# Patient Record
Sex: FEMALE
[De-identification: names, ages, dates, MRNs, and addresses within clinical notes are randomized per-mention and may not be internally consistent; named-entity substitution may affect disease eponyms.]

---

## 2021-06-16 PROBLEM — Z00.00 ENCOUNTER FOR PREVENTIVE HEALTH EXAMINATION: Status: ACTIVE | Noted: 2021-06-16

## 2021-06-22 ENCOUNTER — APPOINTMENT (OUTPATIENT)
Dept: RADIATION ONCOLOGY | Facility: CLINIC | Age: 66
End: 2021-06-22
Payer: COMMERCIAL

## 2021-06-22 DIAGNOSIS — Z78.9 OTHER SPECIFIED HEALTH STATUS: ICD-10-CM

## 2021-06-22 DIAGNOSIS — Z86.39 PERSONAL HISTORY OF OTHER ENDOCRINE, NUTRITIONAL AND METABOLIC DISEASE: ICD-10-CM

## 2021-06-22 PROCEDURE — 99203 OFFICE O/P NEW LOW 30 MIN: CPT | Mod: 25,95

## 2021-06-22 RX ORDER — LEVOTHYROXINE SODIUM 0.07 MG/1
75 TABLET ORAL DAILY
Qty: 30 | Refills: 0 | Status: ACTIVE | COMMUNITY
Start: 2021-06-22

## 2021-06-22 NOTE — OB/GYN HISTORY
[Currently In Menopause] : currently in menopause [Menopause Age: ____] : patient was [unfilled] years old at menopause [___] : Total Pregnancies: [unfilled]

## 2021-07-01 NOTE — LETTER CLOSING
[Consult Closing:] : Thank you for allowing me to participate in the care of this patient.  If you have any questions, please do not hesitate to contact me. [Sincerely yours,] : Sincerely yours, [FreeTextEntry3] : Mirna Pate MD\par

## 2021-07-01 NOTE — HISTORY OF PRESENT ILLNESS
[Home] : at home, [unfilled] , at the time of the visit. [Medical Office: ([facility])___] : at the medical office located in  [Verbal consent obtained from patient] : the patient, [unfilled] [FreeTextEntry1] : Ms. Samuels is a 66 year old woman with newly diagnosed DCIS, who presents for radiotherapy recommendations.\par \par She had mammogram on 11/19/2020, showing indeterminate calcifications in the upper outer left breast, posterior depth.  BI-RADS 0.\par Diagnostic left mammogram on 12/11/20 showed new microcalcifications in the left lateral breast.  These calcifications were seen immediately anterior to a group of coarse stable benign-appearing calcifications.  Biopsy was recommended. \par \par On 1/28/21, she underwent left breast biopsy 3:00, 12 cmfn, with calcifications.  Pathology showing DCIS, intermediate nuclear grade, solid type with central necrosis and associated calcifications. Biopsy left breast 3:00, 12 cm fn, without calcifications.  Pathology shows fibroadipose breast tissue. DCIS was ER/UT positive %.\par \eduar Underwent left lumpectomy on 5/17/2021 at [facility] with Dr. Solis.  Pathology showed DCIS (6.5 mm), intermediate grade.  Distance to closet margin is less than 1 mm; closet margin posterior.   \par \par Interval history - She is feeling well, without pain, recovered well from lumpectomy.  She is overall in good health, does not have med onc provider, as she does not feel she needs one as she declines AI treatment at this time.

## 2021-07-19 ENCOUNTER — OUTPATIENT (OUTPATIENT)
Dept: OUTPATIENT SERVICES | Facility: HOSPITAL | Age: 66
LOS: 1 days | Discharge: HOME | End: 2021-07-19
Payer: COMMERCIAL

## 2021-07-19 ENCOUNTER — RESULT REVIEW (OUTPATIENT)
Age: 66
End: 2021-07-19

## 2021-07-19 PROCEDURE — 77263 THER RADIOLOGY TX PLNG CPLX: CPT

## 2021-07-19 PROCEDURE — 99072 ADDL SUPL MATRL&STAF TM PHE: CPT

## 2021-07-20 LAB — SURGICAL PATHOLOGY STUDY: SIGNIFICANT CHANGE UP

## 2021-07-21 ENCOUNTER — APPOINTMENT (OUTPATIENT)
Dept: RADIATION ONCOLOGY | Facility: CLINIC | Age: 66
End: 2021-07-21
Payer: COMMERCIAL

## 2021-07-21 ENCOUNTER — TRANSCRIPTION ENCOUNTER (OUTPATIENT)
Age: 66
End: 2021-07-21

## 2021-07-21 VITALS
SYSTOLIC BLOOD PRESSURE: 148 MMHG | OXYGEN SATURATION: 98 % | DIASTOLIC BLOOD PRESSURE: 86 MMHG | RESPIRATION RATE: 17 BRPM | HEART RATE: 16 BPM

## 2021-07-21 DIAGNOSIS — D05.10 INTRADUCTAL CARCINOMA IN SITU OF UNSPECIFIED BREAST: ICD-10-CM

## 2021-07-21 PROCEDURE — 77290 THER RAD SIMULAJ FIELD CPLX: CPT | Mod: 26

## 2021-07-21 PROCEDURE — 77333 RADIATION TREATMENT AID(S): CPT | Mod: 26

## 2021-07-21 PROCEDURE — 99072 ADDL SUPL MATRL&STAF TM PHE: CPT

## 2021-07-21 PROCEDURE — 99213 OFFICE O/P EST LOW 20 MIN: CPT | Mod: 25,GC

## 2021-07-22 NOTE — PHYSICAL EXAM
[Sclera] : the sclera and conjunctiva were normal [Outer Ear] : the ears and nose were normal in appearance [] : no respiratory distress [Breast Palpation Mass] : no palpable masses [Breast Abnormal Lactation (Galactorrhea)] : no nipple discharge [No UE Edema] : there is no upper extremity edema [No Focal Deficits] : no focal deficits [Oriented To Time, Place, And Person] : oriented to person, place, and time [Normal] : well developed, well nourished, in no acute distress [Abdomen Soft] : soft [Nondistended] : nondistended [Axillary Lymph Nodes Enlarged Bilaterally] : axillary [Supraclavicular Lymph Nodes Enlarged Bilaterally] : supraclavicular [de-identified] : Left lumpectomy scar is well healed, no erythema

## 2021-07-22 NOTE — HISTORY OF PRESENT ILLNESS
[FreeTextEntry1] : Ms. 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 She was undergoing routine screening mammography, every other year or so. She had mammogram on 11/19/2020, showing indeterminate calcifications in the upper outer left breast, posterior depth. This was new compared to prior exams from 2019, 2017 and 2016. \par \par Diagnostic left mammogram on 12/11/20 showed new microcalcifications in the far lateral left breast. These calcifications were seen immediately anterior to a group of coarse stable benign-appearing calcifications. Biopsy was recommended. \par \par On 1/28/21, she underwent stereotactic left breast biopsy at 3:00, 12 cmfn, with calcifications. Pathology showing DCIS, intermediate nuclear grade, solid type with central necrosis and associated calcifications. Biopsy left breast 3:00, 12 cm fn, without calcifications showed fibroadipose breast tissue. DCIS was ER/MO positive both %.\par \par Underwent left lumpectomy on 5/17/2021 at [facility] with Dr. Solis. Pathology showed DCIS (6.5 mm), cribriform and solid patterns, intermediate nuclear grade, present in 4 out of 27 blocks. Comedo necrosis was present.  Distance to closet margin is less than 1 mm; closest margin was posterior. \par \par  She is feeling well, without pain, recovered well from lumpectomy. She opted against re-excision. She presents for consent for treatment and simulation for RT planning.  She denies breast pain or lumps.  Lumpectomy incision healed well.

## 2021-07-23 PROCEDURE — 77295 3-D RADIOTHERAPY PLAN: CPT | Mod: 26

## 2021-07-23 PROCEDURE — 77334 RADIATION TREATMENT AID(S): CPT | Mod: 26

## 2021-07-23 PROCEDURE — 77300 RADIATION THERAPY DOSE PLAN: CPT | Mod: 26

## 2021-08-02 PROCEDURE — 77280 THER RAD SIMULAJ FIELD SMPL: CPT | Mod: 26

## 2021-08-04 ENCOUNTER — NON-APPOINTMENT (OUTPATIENT)
Age: 66
End: 2021-08-04

## 2021-08-09 PROCEDURE — 77427 RADIATION TX MANAGEMENT X5: CPT

## 2021-08-11 ENCOUNTER — NON-APPOINTMENT (OUTPATIENT)
Age: 66
End: 2021-08-11

## 2021-08-11 NOTE — PHYSICAL EXAM
[General Appearance - Well Developed] : well developed [de-identified] : .Left lumpectomy scar is well healed, mild erythema.

## 2021-08-11 NOTE — DISEASE MANAGEMENT
[Pathological] : TNM Stage: p [0] : 0 [TTNM] : is [NTNM] : 0 [MTNM] : 0 [de-identified] : 0379nGy [de-identified] : 5240cGy [de-identified] : Left breast

## 2021-08-11 NOTE — HISTORY OF PRESENT ILLNESS
[FreeTextEntry1] : Ms. 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 She underwent lumpectomy with close but negative margins. She is now receiving adjuvant radiation therapy. \par \par She comes in for a visit on treatment.  Denies breast pain or fatigue.  She is using Hylatopic Plus cream, recommended by dermatologist.  Some pinkness to the skin, but otherwise well.

## 2021-08-11 NOTE — DISEASE MANAGEMENT
[Pathological] : TNM Stage: p [0] : 0 [TTNM] : is [NTNM] : 0 [MTNM] : 0 [de-identified] : 530cGy [de-identified] : 5240cGy [de-identified] : Left breast

## 2021-08-11 NOTE — HISTORY OF PRESENT ILLNESS
[FreeTextEntry1] : Ms. Samuels is a 66 year old woman with stage 0, [id number], hormone receptor positive DCIS of the left breast. She underwent lumpectomy with close but negative margins. She is now receiving adjuvant radiation therapy. \par \par She is feeling generally well. She denies fatigue and pain. She has not noted any skin reactions. She is using something her dermatologist recommended on the skin. will bring it in next time

## 2021-08-11 NOTE — PHYSICAL EXAM
[General Appearance - Well Developed] : well developed [de-identified] : .Left lumpectomy scar is well healed, no erythema.

## 2021-08-16 PROCEDURE — 77427 RADIATION TX MANAGEMENT X5: CPT

## 2021-08-18 ENCOUNTER — NON-APPOINTMENT (OUTPATIENT)
Age: 66
End: 2021-08-18

## 2021-08-18 PROCEDURE — 77280 THER RAD SIMULAJ FIELD SMPL: CPT | Mod: 26

## 2021-08-18 NOTE — HISTORY OF PRESENT ILLNESS
[FreeTextEntry1] : Ms. Samuels is a 66 year old woman with stage 0, [id number], hormone receptor positive DCIS of the left breast. She underwent lumpectomy with close but negative margins. She is now receiving adjuvant radiation therapy. \par \par She comes in for a visit on treatment.  Denies breast pain or fatigue.  She is using Hylatopic Plus cream, recommended by dermatologist.  Some pinkness to the skin, but otherwise well.

## 2021-08-18 NOTE — PHYSICAL EXAM
[Normal] : well developed, well nourished, in no acute distress [de-identified] : .Left lumpectomy scar is well healed, mild erythema.

## 2021-08-18 NOTE — DISEASE MANAGEMENT
[Pathological] : TNM Stage: p [0] : 0 [TTNM] : is [NTNM] : 0 [MTNM] : 0 [de-identified] : 7109cGy [de-identified] : Left breast [de-identified] : 5240cGy

## 2021-08-23 PROCEDURE — 77427 RADIATION TX MANAGEMENT X5: CPT

## 2021-08-25 ENCOUNTER — NON-APPOINTMENT (OUTPATIENT)
Age: 66
End: 2021-08-25

## 2021-08-30 ENCOUNTER — NON-APPOINTMENT (OUTPATIENT)
Age: 66
End: 2021-08-30

## 2021-08-30 PROCEDURE — 77427 RADIATION TX MANAGEMENT X5: CPT

## 2021-08-31 NOTE — DISEASE MANAGEMENT
[Pathological] : TNM Stage: p [0] : 0 [TTNM] : is [NTNM] : 0 [MTNM] : 0 [de-identified] : 5240cGy [de-identified] : 5240cGy [de-identified] : Left breast

## 2021-08-31 NOTE — REVIEW OF SYSTEMS
[Alopecia: Grade 0] : Alopecia: Grade 0 [Skin Atrophy: Grade 0] : Skin Atrophy: Grade 0 [Skin Hyperpigmentation: Grade 1 - Hyperpigmentation covering <10% BSA; no psychosocial impact] : Skin Hyperpigmentation: Grade 1 - Hyperpigmentation covering <10% BSA; no psychosocial impact [Skin Induration: Grade 0] : Skin Induration: Grade 0 [Dermatitis Radiation: Grade 1 - Faint erythema or dry desquamation] : Dermatitis Radiation: Grade 1 - Faint erythema or dry desquamation [Pruritus: Grade 1 - Mild or localized; topical intervention indicated] : Pruritus: Grade 1 - Mild or localized; topical intervention indicated

## 2021-08-31 NOTE — PHYSICAL EXAM
[Normal] : well developed, well nourished, in no acute distress [de-identified] : .Left lumpectomy scar is well healed, mild erythema.

## 2021-08-31 NOTE — PHYSICAL EXAM
[Normal] : well developed, well nourished, in no acute distress [de-identified] : .Left lumpectomy scar is well healed, mild to moderate erythema and hyperpigmentation, scattered rash

## 2021-08-31 NOTE — HISTORY OF PRESENT ILLNESS
[FreeTextEntry1] : Ms. Samuels is a 66 year old woman with stage 0, [id number], hormone receptor positive DCIS of the left breast. She underwent lumpectomy with close but negative margins. She is now receiving adjuvant radiation therapy. \par \par She is feeling generally well. She is mildly fatigued but remains busy and active. She has occasional mild pain in the breast but does not require analgesics. She has noted increasing redness of the skin. She is using Hylatopic Plus cream on the skin.

## 2021-08-31 NOTE — DISEASE MANAGEMENT
[Pathological] : TNM Stage: p [0] : 0 [TTNM] : is [NTNM] : 0 [MTNM] : 0 [de-identified] : 2340cGy [de-identified] : 5240cGy [de-identified] : Left breast

## 2021-08-31 NOTE — HISTORY OF PRESENT ILLNESS
[FreeTextEntry1] : Ms. Samuels is a 66 year old woman with stage 0, [id number], hormone receptor positive DCIS of the left breast. She underwent lumpectomy with close but negative margins. She is now receiving adjuvant radiation therapy. \par \par She is feeling generally well. She is mildly fatigued but remains busy and active. She has occasional mild pain in the breast but does not require analgesics. She has noted increasing redness and irritation of the skin. She is using Hylatopic Plus cream on the skin. \par

## 2021-10-07 ENCOUNTER — APPOINTMENT (OUTPATIENT)
Dept: RADIATION ONCOLOGY | Facility: CLINIC | Age: 66
End: 2021-10-07

## 2021-10-08 ENCOUNTER — TRANSCRIPTION ENCOUNTER (OUTPATIENT)
Age: 66
End: 2021-10-08